# Patient Record
Sex: FEMALE | Race: WHITE | NOT HISPANIC OR LATINO | Employment: UNEMPLOYED | URBAN - NONMETROPOLITAN AREA
[De-identification: names, ages, dates, MRNs, and addresses within clinical notes are randomized per-mention and may not be internally consistent; named-entity substitution may affect disease eponyms.]

---

## 2019-12-06 ENCOUNTER — OFFICE VISIT (OUTPATIENT)
Dept: INTERNAL MEDICINE | Facility: CLINIC | Age: 44
End: 2019-12-06

## 2019-12-06 VITALS
WEIGHT: 154.8 LBS | TEMPERATURE: 99.6 F | BODY MASS INDEX: 22.93 KG/M2 | HEART RATE: 75 BPM | OXYGEN SATURATION: 97 % | DIASTOLIC BLOOD PRESSURE: 80 MMHG | SYSTOLIC BLOOD PRESSURE: 128 MMHG | HEIGHT: 69 IN

## 2019-12-06 DIAGNOSIS — J45.30 MILD PERSISTENT ASTHMA, UNSPECIFIED WHETHER COMPLICATED: Primary | ICD-10-CM

## 2019-12-06 DIAGNOSIS — Z13.220 LIPID SCREENING: ICD-10-CM

## 2019-12-06 DIAGNOSIS — Z72.0 TOBACCO USE: ICD-10-CM

## 2019-12-06 DIAGNOSIS — N92.6 MISSED PERIOD: ICD-10-CM

## 2019-12-06 DIAGNOSIS — Z12.31 SCREENING MAMMOGRAM, ENCOUNTER FOR: ICD-10-CM

## 2019-12-06 LAB
B-HCG UR QL: NEGATIVE
INTERNAL NEGATIVE CONTROL: NEGATIVE
INTERNAL POSITIVE CONTROL: POSITIVE
Lab: NORMAL

## 2019-12-06 PROCEDURE — 99203 OFFICE O/P NEW LOW 30 MIN: CPT | Performed by: FAMILY MEDICINE

## 2019-12-06 PROCEDURE — 81025 URINE PREGNANCY TEST: CPT | Performed by: FAMILY MEDICINE

## 2019-12-06 RX ORDER — MONTELUKAST SODIUM 10 MG/1
10 TABLET ORAL NIGHTLY
Qty: 30 TABLET | Refills: 5 | Status: SHIPPED | OUTPATIENT
Start: 2019-12-06

## 2019-12-06 RX ORDER — ALBUTEROL SULFATE 90 UG/1
2 AEROSOL, METERED RESPIRATORY (INHALATION) EVERY 4 HOURS PRN
Qty: 1 INHALER | Refills: 5 | Status: SHIPPED | OUTPATIENT
Start: 2019-12-06 | End: 2020-08-07

## 2019-12-06 RX ORDER — ALBUTEROL SULFATE 90 UG/1
2 AEROSOL, METERED RESPIRATORY (INHALATION) EVERY 4 HOURS PRN
COMMUNITY
End: 2019-12-06 | Stop reason: SDUPTHER

## 2019-12-06 NOTE — PROGRESS NOTES
Shayy Grace is a 44 y.o. female.    Chief Complaint   Patient presents with   • Asthma   • Possible Pregnancy       HPI   Patient has Asthma . SHe is not on oxygen. SHe admits to to some dyspnea with exertion.  He admits to on and off cough, SOB and wheezing that does respond to treatment. He uses the albuterol multiple times a day. He is on albuterol inhaler and ipratropium inhaler with good response. However, she has been out here lately. She reports asthma seems to be allergy induced and antihistamines help to control symptoms.     Patient reports no menses since October 1st.  She denies any abdominal cramping.  Denies any spotting.  She does report breast pain off and on.      The following portions of the patient's history were reviewed and updated as appropriate: allergies, current medications, past family history, past medical history, past social history, past surgical history and problem list.     Past Medical History:   Diagnosis Date   • Asthma        Past Surgical History:   Procedure Laterality Date   • APPENDECTOMY     • WISDOM TOOTH EXTRACTION         Family History   Adopted: Yes       Social History     Socioeconomic History   • Marital status:      Spouse name: Not on file   • Number of children: Not on file   • Years of education: Not on file   • Highest education level: Not on file   Tobacco Use   • Smoking status: Current Every Day Smoker     Packs/day: 0.50     Years: 20.00     Pack years: 10.00     Types: Cigarettes   • Smokeless tobacco: Never Used   Substance and Sexual Activity   • Alcohol use: No     Frequency: Never   • Drug use: No   • Sexual activity: Yes     Partners: Male       Allergies   Allergen Reactions   • Penicillins Rash         Current Outpatient Medications:   •  albuterol sulfate  (90 Base) MCG/ACT inhaler, Inhale 2 puffs Every 4 (Four) Hours As Needed for Wheezing or Shortness of Air., Disp: 1 inhaler, Rfl: 5  •  montelukast (SINGULAIR) 10 MG tablet, Take 1  "tablet by mouth Every Night., Disp: 30 tablet, Rfl: 5    ROS    Review of Systems   Constitutional: Negative for chills, fatigue and fever.   HENT: Negative for congestion, postnasal drip and sore throat.    Eyes: Negative for blurred vision and visual disturbance.   Respiratory: Positive for cough, shortness of breath and wheezing.    Cardiovascular: Negative for chest pain.   Gastrointestinal: Negative for abdominal pain, constipation, diarrhea, nausea and vomiting.   Endocrine: Negative for cold intolerance and heat intolerance.   Genitourinary: Negative for dysuria and frequency.   Musculoskeletal: Negative for arthralgias and back pain.   Skin: Negative for color change and rash.   Allergic/Immunologic: Negative for environmental allergies.   Neurological: Negative for weakness, numbness and headache.   Hematological: Does not bruise/bleed easily.   Psychiatric/Behavioral: Negative for depressed mood. The patient is not nervous/anxious.        Vitals:    12/06/19 1501   BP: 128/80   BP Location: Left arm   Patient Position: Sitting   Cuff Size: Adult   Pulse: 75   Temp: 99.6 °F (37.6 °C)   TempSrc: Temporal   SpO2: 97%   Weight: 70.2 kg (154 lb 12.8 oz)   Height: 175.3 cm (69\")     Body mass index is 22.86 kg/m².    Physical Exam     Physical Exam   Constitutional: She is oriented to person, place, and time. She appears well-developed and well-nourished. No distress.   HENT:   Head: Normocephalic and atraumatic.   Right Ear: Tympanic membrane and external ear normal.   Left Ear: Tympanic membrane and external ear normal.   Mouth/Throat: Oropharynx is clear and moist.   Eyes: Conjunctivae and EOM are normal. Pupils are equal, round, and reactive to light.   Neck: Normal range of motion. Neck supple.   Cardiovascular: Normal rate and regular rhythm.   No murmur heard.  Pulmonary/Chest: Effort normal and breath sounds normal. No respiratory distress. She has no wheezes.   Abdominal: Soft. Bowel sounds are normal. " She exhibits no distension. There is no tenderness.   Musculoskeletal: Normal range of motion. She exhibits no edema.   Lymphadenopathy:     She has no cervical adenopathy.   Neurological: She is alert and oriented to person, place, and time. She displays normal reflexes. No cranial nerve deficit.   Skin: Skin is warm and dry.   Psychiatric: She has a normal mood and affect. Her behavior is normal.       Assessment/Plan    Problem List Items Addressed This Visit        Respiratory    Mild persistent asthma - Primary     Suspected to be allergy induced.  Patient is being started on Singulair.  Will discontinue ipratropium.  Patient may continue albuterol on an as-needed basis.  Discussed that the goal is to only take albuterol 2-3 times a week.           Relevant Medications    montelukast (SINGULAIR) 10 MG tablet    albuterol sulfate  (90 Base) MCG/ACT inhaler       Genitourinary    Missed period     Will obtain laboratory studies to further evaluate the patient's hormone levels.  Discussed that she may be going through menopause.         Relevant Orders    POC Pregnancy, Urine (Completed)    CBC & Differential    Comprehensive Metabolic Panel    Estradiol    FSH & LH      Other Visit Diagnoses     Tobacco use        Lipid screening        Relevant Orders    Lipid Panel    Screening mammogram, encounter for        Relevant Orders    Mammo Screening Digital Tomosynthesis Bilateral With CAD          New Medications Ordered This Visit   Medications   • montelukast (SINGULAIR) 10 MG tablet     Sig: Take 1 tablet by mouth Every Night.     Dispense:  30 tablet     Refill:  5   • albuterol sulfate  (90 Base) MCG/ACT inhaler     Sig: Inhale 2 puffs Every 4 (Four) Hours As Needed for Wheezing or Shortness of Air.     Dispense:  1 inhaler     Refill:  5       No orders of the defined types were placed in this encounter.      Return in about 1 month (around 1/6/2020) for asthma.      Korina Joiner DO

## 2019-12-06 NOTE — PATIENT INSTRUCTIONS
For more information:  [employer] [place]  1-800-QUIT-NOW  [email or web address]

## 2019-12-06 NOTE — ASSESSMENT & PLAN NOTE
Suspected to be allergy induced.  Patient is being started on Singulair.  Will discontinue ipratropium.  Patient may continue albuterol on an as-needed basis.  Discussed that the goal is to only take albuterol 2-3 times a week.

## 2019-12-06 NOTE — ASSESSMENT & PLAN NOTE
Will obtain laboratory studies to further evaluate the patient's hormone levels.  Discussed that she may be going through menopause.

## 2020-02-05 ENCOUNTER — HOSPITAL ENCOUNTER (OUTPATIENT)
Dept: GENERAL RADIOLOGY | Facility: HOSPITAL | Age: 45
Discharge: HOME | End: 2020-02-05
Admitting: FAMILY MEDICINE

## 2020-02-05 ENCOUNTER — OFFICE VISIT (OUTPATIENT)
Dept: INTERNAL MEDICINE | Facility: CLINIC | Age: 45
End: 2020-02-05

## 2020-02-05 VITALS
WEIGHT: 160 LBS | OXYGEN SATURATION: 95 % | DIASTOLIC BLOOD PRESSURE: 72 MMHG | SYSTOLIC BLOOD PRESSURE: 96 MMHG | BODY MASS INDEX: 23.7 KG/M2 | TEMPERATURE: 99.3 F | HEART RATE: 78 BPM | HEIGHT: 69 IN

## 2020-02-05 DIAGNOSIS — M54.50 CHRONIC LOW BACK PAIN, UNSPECIFIED BACK PAIN LATERALITY, UNSPECIFIED WHETHER SCIATICA PRESENT: ICD-10-CM

## 2020-02-05 DIAGNOSIS — R10.13 EPIGASTRIC PAIN: Primary | ICD-10-CM

## 2020-02-05 DIAGNOSIS — R11.0 NAUSEA: ICD-10-CM

## 2020-02-05 DIAGNOSIS — G89.29 CHRONIC LOW BACK PAIN, UNSPECIFIED BACK PAIN LATERALITY, UNSPECIFIED WHETHER SCIATICA PRESENT: ICD-10-CM

## 2020-02-05 PROCEDURE — 99213 OFFICE O/P EST LOW 20 MIN: CPT | Performed by: FAMILY MEDICINE

## 2020-02-05 PROCEDURE — 72100 X-RAY EXAM L-S SPINE 2/3 VWS: CPT

## 2020-02-05 NOTE — PROGRESS NOTES
"Shayy Grace is a 44 y.o. female.    Chief Complaint   Patient presents with   • Abdominal Pain     stomach pain after eating        HPI   Patients reports epigastric gnawing type pain for 2 weeks.  She reports nausea.  Denies vomiting, but admits to diarrhea.  She denies heartburn or reflux.  Pain is worse after eating.  She has not taken anything for this issue at all.      Patient reports low back pin with pinched nerve off and on for 10 years.  She has not had any imaging.  She reports she has some numbness and stiffness to the area.  She is not aware of any injury.      The following portions of the patient's history were reviewed and updated as appropriate: allergies, current medications, past family history, past medical history, past social history, past surgical history and problem list.     Allergies   Allergen Reactions   • Penicillins Rash         Current Outpatient Medications:   •  albuterol sulfate  (90 Base) MCG/ACT inhaler, Inhale 2 puffs Every 4 (Four) Hours As Needed for Wheezing or Shortness of Air., Disp: 1 inhaler, Rfl: 5  •  montelukast (SINGULAIR) 10 MG tablet, Take 1 tablet by mouth Every Night., Disp: 30 tablet, Rfl: 5    ROS    Review of Systems   Constitutional: Negative for chills and fever.   HENT: Negative for congestion and rhinorrhea.    Respiratory: Negative for cough and shortness of breath.    Cardiovascular: Negative for chest pain.   Gastrointestinal: Positive for abdominal pain, diarrhea and nausea. Negative for constipation and vomiting.       Vitals:    02/05/20 1058   BP: 96/72   BP Location: Left arm   Patient Position: Sitting   Cuff Size: Adult   Pulse: 78   Temp: 99.3 °F (37.4 °C)   TempSrc: Temporal   SpO2: 95%   Weight: 72.6 kg (160 lb)   Height: 175.3 cm (69\")     Body mass index is 23.63 kg/m².    Physical Exam     Physical Exam   Constitutional: She is oriented to person, place, and time. She appears well-developed and well-nourished. No distress.   HENT: "   Head: Normocephalic and atraumatic.   Right Ear: External ear normal.   Left Ear: External ear normal.   Eyes: Conjunctivae and EOM are normal.   Cardiovascular: Normal rate and regular rhythm.   No murmur heard.  Pulmonary/Chest: Effort normal and breath sounds normal. No respiratory distress. She has no wheezes.   Abdominal: Soft. Bowel sounds are normal. She exhibits no distension. There is tenderness in the epigastric area.   Neurological: She is alert and oriented to person, place, and time. No cranial nerve deficit.   Skin: Skin is warm and dry.   Psychiatric: She has a normal mood and affect.       Assessment/Plan    Problem List Items Addressed This Visit        Digestive    Nausea    Relevant Orders    H. Pylori Breath Test - Breath, Lung       Nervous and Auditory    Epigastric pain - Primary     Will obtain urease breath test to rule out H. pylori.  If negative, may proceed with gallbladder ultrasound.         Relevant Orders    H. Pylori Breath Test - Breath, Lung    Chronic low back pain     Will obtain x-ray.  Patient may benefit from physical therapy.         Relevant Orders    XR Spine Lumbar 2 or 3 View          No orders of the defined types were placed in this encounter.      No orders of the defined types were placed in this encounter.      Return if symptoms worsen or fail to improve.    Korina Joiner, DO

## 2020-02-05 NOTE — ASSESSMENT & PLAN NOTE
Will obtain urease breath test to rule out H. pylori.  If negative, may proceed with gallbladder ultrasound.

## 2020-02-06 LAB
ALBUMIN SERPL-MCNC: 4.6 G/DL (ref 3.5–5.2)
ALBUMIN/GLOB SERPL: 1.5 G/DL
ALP SERPL-CCNC: 82 U/L (ref 39–117)
ALT SERPL-CCNC: 11 U/L (ref 1–33)
AST SERPL-CCNC: 17 U/L (ref 1–32)
BASOPHILS # BLD AUTO: 0.04 10*3/MM3 (ref 0–0.2)
BASOPHILS NFR BLD AUTO: 0.8 % (ref 0–1.5)
BILIRUB SERPL-MCNC: 0.3 MG/DL (ref 0.2–1.2)
BUN SERPL-MCNC: 9 MG/DL (ref 6–20)
BUN/CREAT SERPL: 9.9 (ref 7–25)
CALCIUM SERPL-MCNC: 9.6 MG/DL (ref 8.6–10.5)
CHLORIDE SERPL-SCNC: 103 MMOL/L (ref 98–107)
CHOLEST SERPL-MCNC: 132 MG/DL (ref 0–200)
CO2 SERPL-SCNC: 25.2 MMOL/L (ref 22–29)
CREAT SERPL-MCNC: 0.91 MG/DL (ref 0.57–1)
EOSINOPHIL # BLD AUTO: 0.19 10*3/MM3 (ref 0–0.4)
EOSINOPHIL NFR BLD AUTO: 3.9 % (ref 0.3–6.2)
ERYTHROCYTE [DISTWIDTH] IN BLOOD BY AUTOMATED COUNT: 12.6 % (ref 12.3–15.4)
ESTRADIOL SERPL-MCNC: 18.9 PG/ML
FSH SERPL-ACNC: 45.8 MIU/ML
GLOBULIN SER CALC-MCNC: 3.1 GM/DL
GLUCOSE SERPL-MCNC: 86 MG/DL (ref 65–99)
HCT VFR BLD AUTO: 40.1 % (ref 34–46.6)
HDLC SERPL-MCNC: 53 MG/DL (ref 40–60)
HGB BLD-MCNC: 13.5 G/DL (ref 12–15.9)
IMM GRANULOCYTES # BLD AUTO: 0.02 10*3/MM3 (ref 0–0.05)
IMM GRANULOCYTES NFR BLD AUTO: 0.4 % (ref 0–0.5)
LDLC SERPL CALC-MCNC: 65 MG/DL (ref 0–100)
LH SERPL-ACNC: 37.4 MIU/ML
LYMPHOCYTES # BLD AUTO: 1.78 10*3/MM3 (ref 0.7–3.1)
LYMPHOCYTES NFR BLD AUTO: 36.7 % (ref 19.6–45.3)
MCH RBC QN AUTO: 30.3 PG (ref 26.6–33)
MCHC RBC AUTO-ENTMCNC: 33.7 G/DL (ref 31.5–35.7)
MCV RBC AUTO: 90.1 FL (ref 79–97)
MONOCYTES # BLD AUTO: 0.4 10*3/MM3 (ref 0.1–0.9)
MONOCYTES NFR BLD AUTO: 8.2 % (ref 5–12)
NEUTROPHILS # BLD AUTO: 2.42 10*3/MM3 (ref 1.7–7)
NEUTROPHILS NFR BLD AUTO: 50 % (ref 42.7–76)
NRBC BLD AUTO-RTO: 0 /100 WBC (ref 0–0.2)
PLATELET # BLD AUTO: 237 10*3/MM3 (ref 140–450)
POTASSIUM SERPL-SCNC: 5.2 MMOL/L (ref 3.5–5.2)
PROT SERPL-MCNC: 7.7 G/DL (ref 6–8.5)
RBC # BLD AUTO: 4.45 10*6/MM3 (ref 3.77–5.28)
SODIUM SERPL-SCNC: 139 MMOL/L (ref 136–145)
TRIGL SERPL-MCNC: 72 MG/DL (ref 0–150)
UREA BREATH TEST QL: NEGATIVE
VLDLC SERPL CALC-MCNC: 14.4 MG/DL
WBC # BLD AUTO: 4.85 10*3/MM3 (ref 3.4–10.8)

## 2020-02-07 RX ORDER — OMEPRAZOLE 20 MG/1
20 CAPSULE, DELAYED RELEASE ORAL DAILY
Qty: 30 CAPSULE | Refills: 2 | Status: SHIPPED | OUTPATIENT
Start: 2020-02-07

## 2020-02-12 ENCOUNTER — OFFICE VISIT (OUTPATIENT)
Dept: INTERNAL MEDICINE | Facility: CLINIC | Age: 45
End: 2020-02-12

## 2020-02-12 VITALS
TEMPERATURE: 98.3 F | BODY MASS INDEX: 24.29 KG/M2 | HEART RATE: 117 BPM | WEIGHT: 164 LBS | OXYGEN SATURATION: 95 % | SYSTOLIC BLOOD PRESSURE: 110 MMHG | DIASTOLIC BLOOD PRESSURE: 60 MMHG | HEIGHT: 69 IN

## 2020-02-12 DIAGNOSIS — G89.29 CHRONIC LOW BACK PAIN, UNSPECIFIED BACK PAIN LATERALITY, UNSPECIFIED WHETHER SCIATICA PRESENT: Primary | ICD-10-CM

## 2020-02-12 DIAGNOSIS — F17.210 CIGARETTE NICOTINE DEPENDENCE WITHOUT COMPLICATION: ICD-10-CM

## 2020-02-12 DIAGNOSIS — R10.13 EPIGASTRIC PAIN: ICD-10-CM

## 2020-02-12 DIAGNOSIS — M54.50 CHRONIC LOW BACK PAIN, UNSPECIFIED BACK PAIN LATERALITY, UNSPECIFIED WHETHER SCIATICA PRESENT: Primary | ICD-10-CM

## 2020-02-12 PROCEDURE — 99214 OFFICE O/P EST MOD 30 MIN: CPT | Performed by: FAMILY MEDICINE

## 2020-02-12 RX ORDER — CYCLOBENZAPRINE HCL 10 MG
10 TABLET ORAL 3 TIMES DAILY PRN
Qty: 90 TABLET | Refills: 5 | Status: SHIPPED | OUTPATIENT
Start: 2020-02-12

## 2020-02-12 RX ORDER — MELOXICAM 15 MG/1
15 TABLET ORAL DAILY
Qty: 30 TABLET | Refills: 5 | Status: SHIPPED | OUTPATIENT
Start: 2020-02-12

## 2020-02-12 NOTE — ASSESSMENT & PLAN NOTE
Discussed with patient that this is likely secondary to overuse of ibuprofen.  Will place the patient on once daily meloxicam to decrease the risk of NSAID damage to the stomach.  She is encouraged to start Prilosec as well.

## 2020-02-12 NOTE — ASSESSMENT & PLAN NOTE
Will refer to physical therapy.  Patient is to take meloxicam as needed for pain in addition to Flexeril as needed.

## 2020-02-12 NOTE — PROGRESS NOTES
Shayy Grace is a 44 y.o. female.    Chief Complaint   Patient presents with   • Hip Pain     Rt side   • Nicotine Dependence       HPI   Patient presents today to discuss recent lumbar x-ray.  Patient did have moderate degenerative changes on x-ray.  She does report consistent achy low back pain that does radiate into her hips.  She has been taking ibuprofen quite frequently without any improvement.  She has taken Flexeril in the past with good response.    Patient was recently prescribed Prilosec for epigastric pain.  H. pylori was negative.  However, she does admit to significant NSAID usage.  She has not started Prilosec just yet.    Patient is also tried to quit smoking.  She has purchased nicotine gum over-the-counter and has had good success with it.  She is currently using 4 mg as needed.    The following portions of the patient's history were reviewed and updated as appropriate: allergies, current medications, past family history, past medical history, past social history, past surgical history and problem list.     Allergies   Allergen Reactions   • Penicillins Rash         Current Outpatient Medications:   •  albuterol sulfate  (90 Base) MCG/ACT inhaler, Inhale 2 puffs Every 4 (Four) Hours As Needed for Wheezing or Shortness of Air., Disp: 1 inhaler, Rfl: 5  •  montelukast (SINGULAIR) 10 MG tablet, Take 1 tablet by mouth Every Night., Disp: 30 tablet, Rfl: 5  •  omeprazole (priLOSEC) 20 MG capsule, Take 1 capsule by mouth Daily., Disp: 30 capsule, Rfl: 2  •  cyclobenzaprine (FLEXERIL) 10 MG tablet, Take 1 tablet by mouth 3 (Three) Times a Day As Needed for Muscle Spasms., Disp: 90 tablet, Rfl: 5  •  meloxicam (MOBIC) 15 MG tablet, Take 1 tablet by mouth Daily., Disp: 30 tablet, Rfl: 5  •  nicotine polacrilex ([employer] NICOTINE) 4 MG gum, Chew 1 each Every 2 (Two) Hours As Needed for Smoking Cessation., Disp: 220 each, Rfl: 1    ROS    Review of Systems   Constitutional: Negative for chills and fever.  "  Respiratory: Negative for shortness of breath.    Cardiovascular: Negative for chest pain.   Gastrointestinal: Positive for abdominal pain, diarrhea and nausea. Negative for constipation and vomiting.   Musculoskeletal: Positive for back pain.       Vitals:    02/12/20 0912   BP: 110/60   BP Location: Left arm   Patient Position: Sitting   Cuff Size: Adult   Pulse: 117   Temp: 98.3 °F (36.8 °C)   TempSrc: Temporal   SpO2: 95%   Weight: 74.4 kg (164 lb)   Height: 175.3 cm (69\")     Body mass index is 24.22 kg/m².    Physical Exam     Physical Exam   Constitutional: She is oriented to person, place, and time. She appears well-developed and well-nourished. No distress.   HENT:   Head: Normocephalic and atraumatic.   Right Ear: External ear normal.   Left Ear: External ear normal.   Eyes: Conjunctivae and EOM are normal.   Cardiovascular: Normal rate.   Pulmonary/Chest: Effort normal. No respiratory distress.   Musculoskeletal:   Decreased range of motion of lumbar spine.  Gait abnormality.  Ambulates with a lumbar spine bent forward.   Neurological: She is alert and oriented to person, place, and time. No cranial nerve deficit.   Skin: Skin is warm and dry.   Psychiatric: She has a normal mood and affect. Her behavior is normal.       Assessment/Plan    Problem List Items Addressed This Visit        Nervous and Auditory    Epigastric pain     Discussed with patient that this is likely secondary to overuse of ibuprofen.  Will place the patient on once daily meloxicam to decrease the risk of NSAID damage to the stomach.  She is encouraged to start Prilosec as well.         Chronic low back pain - Primary     Will refer to physical therapy.  Patient is to take meloxicam as needed for pain in addition to Flexeril as needed.         Relevant Orders    Ambulatory Referral to Physical Therapy Evaluate and treat       Other    Cigarette nicotine dependence without complication     4 mg of nicotine gum is been sent to the " pharmacy.  She may use 1 every 2 hours as needed.  She has been encouraged to wean down to 2 mg as needed.  She is to notify the office when she is ready for prescription of 2 mg.         Relevant Medications    nicotine polacrilex ([employer] NICOTINE) 4 MG gum          New Medications Ordered This Visit   Medications   • meloxicam (MOBIC) 15 MG tablet     Sig: Take 1 tablet by mouth Daily.     Dispense:  30 tablet     Refill:  5   • cyclobenzaprine (FLEXERIL) 10 MG tablet     Sig: Take 1 tablet by mouth 3 (Three) Times a Day As Needed for Muscle Spasms.     Dispense:  90 tablet     Refill:  5   • nicotine polacrilex ([employer] NICOTINE) 4 MG gum     Sig: Chew 1 each Every 2 (Two) Hours As Needed for Smoking Cessation.     Dispense:  220 each     Refill:  1       No orders of the defined types were placed in this encounter.      Return in about 2 months (around 4/12/2020) for back pain, GERD.    Korina Joiner,

## 2020-02-12 NOTE — ASSESSMENT & PLAN NOTE
4 mg of nicotine gum is been sent to the pharmacy.  She may use 1 every 2 hours as needed.  She has been encouraged to wean down to 2 mg as needed.  She is to notify the office when she is ready for prescription of 2 mg.

## 2020-03-19 NOTE — TELEPHONE ENCOUNTER
PATIENT CALLED AND STATED THAT SHE NEEDS A REFILL ON HER nicotine polacrilex ([employer] NICOTINE) 4 MG gum. SHE STATES SHE HAS NO REFILLS LEFT ON HER PRESCRIPTION AND WAS DIRECTED TO REQUEST SOME FROM HER PROVIDER, LOLA BURKETT. PLEASE ADVISE.     PHARMACY IS [address]     PATIENT CALL BACK [phone]

## 2020-08-07 RX ORDER — ALBUTEROL SULFATE 90 UG/1
AEROSOL, METERED RESPIRATORY (INHALATION)
Qty: 8.5 G | Refills: 2 | Status: SHIPPED | OUTPATIENT
Start: 2020-08-07 | End: 2020-10-27

## 2020-10-27 RX ORDER — ALBUTEROL SULFATE 90 UG/1
AEROSOL, METERED RESPIRATORY (INHALATION)
Qty: 8.5 G | Refills: 0 | Status: SHIPPED | OUTPATIENT
Start: 2020-10-27

## 2020-12-01 ENCOUNTER — TRANSCRIBE ORDERS (OUTPATIENT)
Dept: ADMINISTRATIVE | Facility: HOSPITAL | Age: 45
End: 2020-12-01

## 2020-12-01 DIAGNOSIS — E04.1 THYROID NODULE: Primary | ICD-10-CM

## 2020-12-08 ENCOUNTER — TELEPHONE (OUTPATIENT)
Dept: INTERNAL MEDICINE | Facility: CLINIC | Age: 45
End: 2020-12-08

## 2020-12-08 NOTE — TELEPHONE ENCOUNTER
Patient did not complete mammogram ordered on 12/06/2019. This order is too old to be used. May I cancel the order?

## 2020-12-21 ENCOUNTER — APPOINTMENT (OUTPATIENT)
Dept: ULTRASOUND IMAGING | Facility: HOSPITAL | Age: 45
End: 2020-12-21